# Patient Record
Sex: FEMALE | Race: AMERICAN INDIAN OR ALASKA NATIVE
[De-identification: names, ages, dates, MRNs, and addresses within clinical notes are randomized per-mention and may not be internally consistent; named-entity substitution may affect disease eponyms.]

---

## 2022-05-21 ENCOUNTER — HOSPITAL ENCOUNTER (EMERGENCY)
Dept: HOSPITAL 5 - ED | Age: 32
Discharge: HOME | End: 2022-05-21
Payer: SELF-PAY

## 2022-05-21 VITALS — SYSTOLIC BLOOD PRESSURE: 112 MMHG | DIASTOLIC BLOOD PRESSURE: 78 MMHG

## 2022-05-21 DIAGNOSIS — Z79.899: ICD-10-CM

## 2022-05-21 DIAGNOSIS — T78.40XA: Primary | ICD-10-CM
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
## 2022-05-21 PROCEDURE — 96374 THER/PROPH/DIAG INJ IV PUSH: CPT

## 2022-05-21 PROCEDURE — 96375 TX/PRO/DX INJ NEW DRUG ADDON: CPT

## 2022-05-21 PROCEDURE — 99282 EMERGENCY DEPT VISIT SF MDM: CPT

## 2022-05-21 NOTE — EMERGENCY DEPARTMENT REPORT
ED General Adult HPI





- General


Chief complaint: Allergic Reaction


Stated complaint: ALLERGIC REACTION


Source: patient


Mode of arrival: Ambulatory


Limitations: No Limitations





- History of Present Illness


Initial comments: 


32-year-old female presents to the ED with swelling to the right eye.  Patient 

states that she awakened this mornin around 7 AM and after touching her eye she 

noticed that her right eye begins to swell.  She states that she immediately 

took a Benadryl started to relieve the tightness of her eye.  Patient state 

runny nose and itchy to the eye.  Patient denies any blurry vision, headache or 

loss of conscious.





-: This morning


Location: eyes, left


Radiation: non-radiation


Severity scale (0 -10): 0


Consistency: intermittent


Improves with: none


Worsens with: none


Associated Symptoms: denies other symptoms


Treatments Prior to Arrival: none





- Related Data


                                  Previous Rx's











 Medication  Instructions  Recorded  Last Taken  Type


 


Famotidine [Pepcid] 20 mg PO BID #6 tablet 05/21/22 Unknown Rx


 


cephALEXin [Keflex] 500 mg PO Q12HR 10 Days #20 cap 05/21/22 Unknown Rx


 


predniSONE [Deltasone] 50 mg PO QDAY 5 Days #5 tab 05/21/22 Unknown Rx











                                    Allergies











Allergy/AdvReac Type Severity Reaction Status Date / Time


 


shellfish derived Allergy  Anaphylaxis Verified 05/21/22 15:56














ED Review of Systems


ROS: 


Stated complaint: ALLERGIC REACTION


Other details as noted in HPI





Constitutional: denies: chills, fever


Eyes: denies: eye pain, eye discharge, vision change


ENT: denies: ear pain, throat pain


Respiratory: denies: cough, shortness of breath, wheezing


Cardiovascular: denies: chest pain, palpitations


Endocrine: no symptoms reported


Gastrointestinal: denies: abdominal pain, nausea, diarrhea


Genitourinary: denies: urgency, dysuria, discharge


Musculoskeletal: denies: back pain, joint swelling, arthralgia


Skin: denies: rash, lesions


Neurological: denies: headache, weakness, paresthesias


Psychiatric: denies: anxiety, depression


Hematological/Lymphatic: denies: easy bleeding, easy bruising





ED Past Medical Hx





- Past Medical History


Previous Medical History?: No





- Surgical History


Past Surgical History?: No





- Medications


Home Medications: 


                                Home Medications











 Medication  Instructions  Recorded  Confirmed  Last Taken  Type


 


Famotidine [Pepcid] 20 mg PO BID #6 tablet 05/21/22  Unknown Rx


 


cephALEXin [Keflex] 500 mg PO Q12HR 10 Days #20 cap 05/21/22  Unknown Rx


 


predniSONE [Deltasone] 50 mg PO QDAY 5 Days #5 tab 05/21/22  Unknown Rx














ED Physical Exam





- General


Limitations: No Limitations


General appearance: alert, in no apparent distress





- Head


Head exam: Present: atraumatic, normocephalic





- Eye


Eye exam: Present: normal appearance





- ENT


ENT exam: Present: mucous membranes moist





- Neck


Neck exam: Present: normal inspection





- Respiratory


Respiratory exam: Present: normal lung sounds bilaterally.  Absent: respiratory 

distress





- Cardiovascular


Cardiovascular Exam: Present: regular rate, normal rhythm.  Absent: systolic 

murmur, diastolic murmur, rubs, gallop





- GI/Abdominal


GI/Abdominal exam: Present: soft, normal bowel sounds





- Extremities Exam


Extremities exam: Present: normal inspection





- Back Exam


Back exam: Present: normal inspection





- Neurological Exam


Neurological exam: Present: alert, oriented X3





- Psychiatric


Psychiatric exam: Present: normal affect, normal mood





- Skin


Skin exam: Present: warm, dry, intact, normal color.  Absent: rash





ED Course


                                   Vital Signs











  05/21/22





  16:08


 


Temperature 98.3 F


 


Pulse Rate 70


 


Respiratory 18





Rate 


 


Blood Pressure 112/78


 


O2 Sat by Pulse 100





Oximetry 














ED Medical Decision Making





- Medical Decision Making


32-year-old female presents to the ED with swelling to the right eye.  Patient 

states that she awakened this mornin around 7 AM and after touching her eye she 

noticed that her right eye begins to swell.  She states that she immediately 

took a Benadryl started to relieve the tightness of her eye.  Patient state 

runny nose and itchy to the eye.  Patient denies any blurry vision, headache or 

loss of conscious.  Physical examination patient has edema noted to the right.  

Solu-Medrol 125 mg iv, Pepcid 20 mg IV, and Benadryl 25 mg p.o. given.  Patient 

was ordered for 1 hour decrease edema noted patient states that she needed to go

 because her ride was here.  





Rechecked the patient is resting quietly quietly and comfortable and feeling 

better. I discussed the results of diagnostic study, my clinical impression and 

the plan for further treatment with the patient. Patient agrees with plan and 

discharge at this present time. All question addressed.





I have given the patient instruction regarding a diagnosis ,expectation ,follow-

up and return precaution. I explained to the patient that emergent condition may

 arise and to return to the ED for new worsen and any new persisting condition. 

I have explained the importance of following up with the primary care physician 

or referral physician listed below has instructed. The patient verbalized 

understanding of discharge instruction.











Critical care attestation.: 


If time is entered above; I have spent that time in minutes in the direct care 

of this critically ill patient, excluding procedure time.








ED Disposition


Clinical Impression: 


Allergic reaction


Qualifiers:


 Encounter type: initial encounter Qualified Code(s): T78.40XA - Allergy, 

unspecified, initial encounter





Disposition: 01 HOME / SELF CARE / HOMELESS


Is pt being admited?: No


Does the pt Need Aspirin: No


Condition: Stable


Instructions:  Allergies, Adult, Easy-to-Read, Contact Dermatitis


Additional Instructions: 


Take medication as prescribed


Return to ED for any worsening symptoms


Prescriptions: 


predniSONE [Deltasone] 50 mg PO QDAY 5 Days #5 tab


cephALEXin [Keflex] 500 mg PO Q12HR 10 Days #20 cap


Famotidine [Pepcid] 20 mg PO BID #6 tablet


Referrals: 


[facility] [Provider Group] - 3-5 Days


Forms:  Work/School Release Form(ED)


Time of Disposition: 17:28